# Patient Record
Sex: FEMALE | Race: WHITE | URBAN - METROPOLITAN AREA
[De-identification: names, ages, dates, MRNs, and addresses within clinical notes are randomized per-mention and may not be internally consistent; named-entity substitution may affect disease eponyms.]

---

## 2018-07-25 ENCOUNTER — APPOINTMENT (RX ONLY)
Dept: URBAN - METROPOLITAN AREA CLINIC 319 | Facility: CLINIC | Age: 28
Setting detail: DERMATOLOGY
End: 2018-07-25

## 2018-07-25 DIAGNOSIS — D22 MELANOCYTIC NEVI: ICD-10-CM

## 2018-07-25 PROBLEM — D22.9 MELANOCYTIC NEVI, UNSPECIFIED: Status: ACTIVE | Noted: 2018-07-25

## 2018-07-25 PROBLEM — D23.72 OTHER BENIGN NEOPLASM OF SKIN OF LEFT LOWER LIMB, INCLUDING HIP: Status: ACTIVE | Noted: 2018-07-25

## 2018-07-25 PROCEDURE — 99202 OFFICE O/P NEW SF 15 MIN: CPT

## 2018-07-25 PROCEDURE — ? ERBIUM: YAG NON-ABLATIVE LASER

## 2018-07-25 PROCEDURE — ? COUNSELING

## 2018-07-25 NOTE — PROCEDURE: ERBIUM: YAG NON-ABLATIVE LASER
Detail Level: Zone
Treatment Site: test area left thigh
Energy In Mj/Cm2: 0.3
Pre-Procedure Text: The procedure was explained and consent was obtained. The area was cleaned and prepped. All persons present in the exam room put on their protective eyewear. MAsks were worn.\\nLaser was calibrated. A laser safety test was performed. Settings confirmed and stated out loud.
Procedural Text: The treatment area was then ablated. A smoke evacuator was used during the treatment.  (note - superior to the test area another test area was done with 7mm spot size - 1.5 mj - did not do many passes  - more painful for patient
Post-Procedure Text: Following the treatment, ice and broad spectrum sunscreen was applied to the treatment areas.  Post-care instructions were discussed.
External Cooling Fan Speed: 5
Post-Care Instructions: I reviewed with the patient in detail post-care instructions. Patient is to apply vaseline with a q-tip to all crusted areas, and avoid picking at any scabs. Pt should stay away from the sun and wear sun protection until fully healed.
Spot Size: 1mm
Consent: Written consent obtained, risks reviewed including but not limited to crusting, scabbing, blistering, scarring, darker or lighter pigmentary change, and/or incomplete removal.